# Patient Record
Sex: MALE | Race: WHITE | NOT HISPANIC OR LATINO | Employment: FULL TIME | URBAN - NONMETROPOLITAN AREA
[De-identification: names, ages, dates, MRNs, and addresses within clinical notes are randomized per-mention and may not be internally consistent; named-entity substitution may affect disease eponyms.]

---

## 2017-12-26 ENCOUNTER — OFFICE VISIT (OUTPATIENT)
Dept: PODIATRY | Facility: CLINIC | Age: 52
End: 2017-12-26

## 2017-12-26 VITALS
HEART RATE: 80 BPM | BODY MASS INDEX: 36.8 KG/M2 | DIASTOLIC BLOOD PRESSURE: 90 MMHG | HEIGHT: 69 IN | SYSTOLIC BLOOD PRESSURE: 141 MMHG | OXYGEN SATURATION: 95 % | WEIGHT: 248.46 LBS

## 2017-12-26 DIAGNOSIS — M21.542 ANKLE VARUS, ACQUIRED, LEFT: ICD-10-CM

## 2017-12-26 DIAGNOSIS — M19.079 ANKLE ARTHRITIS: Primary | ICD-10-CM

## 2017-12-26 DIAGNOSIS — M25.572 ACUTE LEFT ANKLE PAIN: ICD-10-CM

## 2017-12-26 PROCEDURE — 99203 OFFICE O/P NEW LOW 30 MIN: CPT | Performed by: PODIATRIST

## 2017-12-26 RX ORDER — MELOXICAM 15 MG/1
15 TABLET ORAL DAILY
Qty: 30 TABLET | Refills: 3 | Status: SHIPPED | OUTPATIENT
Start: 2017-12-26 | End: 2019-07-22

## 2017-12-26 RX ORDER — AMLODIPINE BESYLATE AND BENAZEPRIL HYDROCHLORIDE 5; 20 MG/1; MG/1
1 CAPSULE ORAL 2 TIMES DAILY
COMMUNITY
Start: 2017-08-09 | End: 2021-10-11 | Stop reason: ALTCHOICE

## 2018-01-08 ENCOUNTER — TELEPHONE (OUTPATIENT)
Dept: PODIATRY | Facility: CLINIC | Age: 53
End: 2018-01-08

## 2018-01-08 NOTE — TELEPHONE ENCOUNTER
I believe this is one I faxed Friday before last. I will double check tomorrow when I get back to [place] office.

## 2018-01-08 NOTE — TELEPHONE ENCOUNTER
PATIENT WIFE BETHANY CALLED ON BEHALF OF PATIENT AND HE SEEN DR SANTAMARIA 12/26/2017 AND WAS SUPPOSE TO BE REFERRED SOMEWHERE FOR AN [facility] FABRICATION.  THEY HAVE YET TO HEAR ANYTHING, NOTHING NOTED IN CHART BUT THEY CALLED [place] AND THEY HAVE NOT RECEIVED ANYTHING ON PATIENT.  IF YOU COULD PLEASE MAKE REFERRAL OR CALL AND LET PATIENT KNOW WHAT IS BEING DONE.

## 2019-07-22 ENCOUNTER — OFFICE VISIT (OUTPATIENT)
Dept: SLEEP MEDICINE | Facility: HOSPITAL | Age: 54
End: 2019-07-22

## 2019-07-22 VITALS
HEART RATE: 67 BPM | WEIGHT: 257.6 LBS | HEIGHT: 69 IN | BODY MASS INDEX: 38.16 KG/M2 | SYSTOLIC BLOOD PRESSURE: 143 MMHG | DIASTOLIC BLOOD PRESSURE: 94 MMHG | OXYGEN SATURATION: 98 %

## 2019-07-22 DIAGNOSIS — G47.33 OBSTRUCTIVE SLEEP APNEA, ADULT: Primary | ICD-10-CM

## 2019-07-22 PROCEDURE — 99203 OFFICE O/P NEW LOW 30 MIN: CPT | Performed by: INTERNAL MEDICINE

## 2019-07-22 RX ORDER — CELECOXIB 200 MG/1
200 CAPSULE ORAL DAILY
COMMUNITY

## 2019-07-22 RX ORDER — METOPROLOL SUCCINATE 25 MG/1
25 TABLET, EXTENDED RELEASE ORAL DAILY
COMMUNITY
Start: 2018-12-03 | End: 2019-12-04

## 2019-07-22 NOTE — PROGRESS NOTES
Answers for HPI/ROS submitted by the patient on 7/21/2019   Hypertension  Chronicity: recurrent  Onset: more than 1 year ago  Progression since onset: waxing and waning  Condition status: controlled  anxiety: No  blurred vision: No  chest pain: No  headaches: No  malaise/fatigue: No  neck pain: No  orthopnea: No  palpitations: No  peripheral edema: No  PND: No  shortness of breath: No  sweats: No  Agents associated with hypertension: no associated agents  CAD risks: obesity  Compliance problems: diet    New Patient Sleep Medicine Consultation    Encounter Date: 7/22/2019         Patient's PCP: System, Provider Not In  Referring provider: No ref. provider found  Reason for consultation chief complaint: snoring, excessive daytime sleepiness and unrefreshing sleep    Gerardo Cornelius is a 54 y.o. male who admits to snoring, unrestful sleep, High blod pressure, excessive daytime sleepiness, sleeping less than 6 hours per night, Disturbed or restless sleep, Up to the bathroom at night, night sweats and restless legs at night.   He denies cataplexy, sleep paralysis, or hypnagogic hallucinations. His bedtime is ~ 2000. He  falls asleep after 2 minutes, and is up 2-3 times per night. He wakes up ~ 0300. He endorses 6 hours of sleep. He drinks 1 cups of coffee, 0 teas, and 1 sodas per day. He drinks 0 alcoholic beverages per week. He is not a current smoker. He does not take sedatives or hypnotics. He has some sleepiness with driving. He naps when unstimulated.     Guy - 15    Past Medical History:   Diagnosis Date   • Hypertension      Social History     Socioeconomic History   • Marital status: Unknown     Spouse name: Not on file   • Number of children: Not on file   • Years of education: Not on file   • Highest education level: Not on file   Tobacco Use   • Smoking status: Former Smoker   • Smokeless tobacco: Current User     Types: Chew   Substance and Sexual Activity   • Alcohol use: No   • Drug use: No  "    Family History   Problem Relation Age of Onset   • Diabetes Mother    • Thyroid disease Mother    • Hypertension Father    • No Known Problems Sister    • No Known Problems Maternal Grandmother    • No Known Problems Maternal Grandfather    • No Known Problems Paternal Grandmother    • No Known Problems Paternal Grandfather    • No Known Problems Sister    • No Known Problems Sister    • No Known Problems Son    • No Known Problems Daughter    , 2 kids  0 brothers and 3 sisters  Other family history + for: thyroid, diabetes, HTN  Smoking history: smoked 0.5 ppds from age 17 until 36  FH of sleep disorders: friend    Review of Systems: + scleral injection  Constitutional: negative  Eyes: negative  Ears, nose, mouth, throat, and face: negative  Respiratory: negative  Cardiovascular: negative  Gastrointestinal: negative  Genitourinary:negative  Integument/breast: negative  Hematologic/lymphatic: negative  Musculoskeletal:negative  Neurological: negative  Behavioral/Psych: negative  Endocrine: negative  Allergic/Immunologic: negative Patient advised to discuss any positive ROS with PCP.      Vitals:    07/22/19 1548   BP: 143/94   Pulse: 67   SpO2: 98%           07/22/19  1548   Weight: 117 kg (257 lb 9.6 oz)       Body mass index is 38.02 kg/m². Patient's Body mass index is 38.02 kg/m². BMI is above normal parameters. Recommendations include: referral to primary care.  Neck circumference: 18\"          General: Alert. Cooperative. Well developed. No acute distress.             Head:  Normocephalic. Symmetrical. Atraumatic.              Eyes: Sclera clear. No icterus. PERRLA. Normal EOM.             Ears: No deformities. Normal hearing.             Nose: No septal deviation. No drainage.          Throat: No oral lesions. No thrush. Moist mucous membranes. Trachea midline    Tongue is enlarged    Dentition is fair with some crowding       Pharynx: Posterior pharyngeal pillars are narrow    Mallampati score of " IV (only hard palate visible)    Pharynx is normal with unrermarkable tonsils   Chest Wall:  Normal shape. Symmetric expansion with respiration. No tenderness.          Lungs:  Clear to auscultation bilaterally. No wheezes. No rhonchi. No rales. Respirations regular, even and unlabored.            Heart:  Regular rhythm and normal rate. Normal S1 and S2. No murmur.     Abdomen:  Soft, non-tender and non-distended. Normal bowel sounds. No masses.  Extremities:  Moves all extremities well. No edema.           Pulses: Pulses palpable and equal bilaterally.               Skin: Dry. Intact. No bleeding. No rash.           Neuro: Moves all 4 extremities and cranial nerves grossly intact.  Psychiatric: Normal mood and affect.      Current Outpatient Medications:   •  amLODIPine-benazepril (LOTREL 5-20) 5-20 MG per capsule, Take 1 capsule by mouth 2 (Two) Times a Day., Disp: , Rfl:   •  celecoxib (CeleBREX) 200 MG capsule, Take 200 mg by mouth Daily., Disp: , Rfl:   •  metoprolol succinate XL (TOPROL XL) 25 MG 24 hr tablet, Take 25 mg by mouth Daily., Disp: , Rfl:     WBC   Date Value Ref Range Status   12/03/2018 11.7 (H) 4.0 - 11.0 10*3/uL Final     RBC   Date Value Ref Range Status   12/03/2018 5.98 (H) 4.73 - 5.49 10*6/uL Final     Hemoglobin   Date Value Ref Range Status   12/03/2018 16.8 (H) 14.4 - 16.6 g/dL Final     Hematocrit   Date Value Ref Range Status   12/03/2018 51.8 (H) 42.9 - 49.1 % Final     MCV   Date Value Ref Range Status   12/03/2018 87 85 - 95 fl Final     MCH   Date Value Ref Range Status   12/03/2018 28.1 28.0 - 32.0 pg Final     MCHC   Date Value Ref Range Status   12/03/2018 32.4 32.0 - 34.0 g/dL Final     RDW   Date Value Ref Range Status   12/03/2018 39.2 37 - 54 fl Final     MPV   Date Value Ref Range Status   12/03/2018 12 8.0 - 12.0 fl Final     Platelets   Date Value Ref Range Status   12/03/2018 317 150 - 450 10*3/uL Final     Lymphocyte Rel %   Date Value Ref Range Status   12/03/2018 24.6  5 - 55 % Final     Monocyte Rel %   Date Value Ref Range Status   12/03/2018 8.2 (H) 3 - 8 % Final     Eosinophil Rel %   Date Value Ref Range Status   12/03/2018 8.7 (H) 0 - 5 % Final     Basophil Rel %   Date Value Ref Range Status   12/03/2018 0.7 0 - 2 % Final     Immature Grans %   Date Value Ref Range Status   12/03/2018 57.6 45 - 80 % Final     nRBC   Date Value Ref Range Status   12/03/2018 0 0.0 - 0.0 % Final       ASSESSMENT:  1. Obstructive sleep apnea New, further workup planned (4)  1. Check home sleep study   2. Variable work shift  3. Conjunctivitis  1. Follow up with Dr. Field    RTC in 3 months         This document has been electronically signed by Garrett Wellington MD on July 22, 2019         CC: System, Provider Not In          No ref. provider found

## 2019-08-27 ENCOUNTER — HOSPITAL ENCOUNTER (OUTPATIENT)
Dept: SLEEP MEDICINE | Facility: HOSPITAL | Age: 54
Discharge: HOME | End: 2019-08-27
Admitting: INTERNAL MEDICINE

## 2019-08-27 DIAGNOSIS — G47.33 OBSTRUCTIVE SLEEP APNEA, ADULT: ICD-10-CM

## 2019-08-27 PROCEDURE — 95800 SLP STDY UNATTENDED: CPT | Performed by: INTERNAL MEDICINE

## 2019-08-27 PROCEDURE — 95800 SLP STDY UNATTENDED: CPT

## 2019-08-30 DIAGNOSIS — G47.33 OBSTRUCTIVE SLEEP APNEA, ADULT: Primary | ICD-10-CM

## 2019-09-27 ENCOUNTER — HOSPITAL ENCOUNTER (OUTPATIENT)
Dept: SLEEP MEDICINE | Facility: HOSPITAL | Age: 54
Discharge: HOME | End: 2019-09-27
Admitting: INTERNAL MEDICINE

## 2019-09-27 VITALS — HEIGHT: 69 IN | BODY MASS INDEX: 38.06 KG/M2 | WEIGHT: 257 LBS

## 2019-09-27 DIAGNOSIS — G47.33 OBSTRUCTIVE SLEEP APNEA, ADULT: ICD-10-CM

## 2019-09-27 PROCEDURE — 95811 POLYSOM 6/>YRS CPAP 4/> PARM: CPT

## 2019-09-27 PROCEDURE — 95811 POLYSOM 6/>YRS CPAP 4/> PARM: CPT | Performed by: INTERNAL MEDICINE

## 2019-10-10 DIAGNOSIS — G47.33 OBSTRUCTIVE SLEEP APNEA, ADULT: Primary | ICD-10-CM

## 2019-10-15 ENCOUNTER — TELEPHONE (OUTPATIENT)
Dept: SLEEP MEDICINE | Facility: HOSPITAL | Age: 54
End: 2019-10-15

## 2019-10-15 NOTE — TELEPHONE ENCOUNTER
Patient called and was given results of Sleep Test.  Understood and agreed to treatment with CPAP as ordered by the physician.  Follow up appointment was scheduled.

## 2019-12-03 ENCOUNTER — OFFICE VISIT (OUTPATIENT)
Dept: SLEEP MEDICINE | Facility: HOSPITAL | Age: 54
End: 2019-12-03

## 2019-12-03 VITALS
HEIGHT: 69 IN | SYSTOLIC BLOOD PRESSURE: 141 MMHG | OXYGEN SATURATION: 98 % | HEART RATE: 68 BPM | WEIGHT: 263 LBS | BODY MASS INDEX: 38.95 KG/M2 | DIASTOLIC BLOOD PRESSURE: 89 MMHG

## 2019-12-03 DIAGNOSIS — G47.33 OBSTRUCTIVE SLEEP APNEA, ADULT: Primary | ICD-10-CM

## 2019-12-03 PROCEDURE — 99213 OFFICE O/P EST LOW 20 MIN: CPT | Performed by: NURSE PRACTITIONER

## 2019-12-03 NOTE — PROGRESS NOTES
Sleep Clinic Follow Up    Date: 12/3/2019  Primary Care Physician: Gilmra Field MD    Last office visit: 2019 (I reviewed this note)    CC: Follow up: CANDE started on BiPAP      Interim History:  Since the last visit:    1) severe CANDE -  Gerardo Cornelius has remained compliant with BiPAP. He denies mask and machine issues, dry mouth, headaches, or pressures intolerance. He denies abnormal dreams, sleep paralysis, nasal congestion, URI sx. Since starting CPAP therapy, patient reports less daytime sleepiness, more refreshing sleep, and feeling better overall. He is no longer snoring.    Sleep Testin. HST on , AHI of 60.7, bradycardia/tachycardia, severe oxygen desaturations  2. CPAP titration on 2019, recommended 25/17 cm H2O   3. Currently on 25/17 cm H2O    PAP Data:    Time frame: 10/25/2019-2019   Compliance 100 %  Average use on days used: 8 hrs 8 min  Percent of days with usage greater than or equal to 4 hours: 100%  PAP range : 25/17 cm H2O  Average 90% pressure: 24/18.5 cmH2O  Leak: 8 minutes  Average AHI 7.3 events/hr  Mask type: Full face mask  [facility]ME[facility]    Bed time: 2000  Sleep latency: 2 minutes  Number of times awakens during the night: 0  Wake time: 0300  Estimated total sleep time at night: 6-7 hours  Caffeine intake: 1 cups of coffee, 0 cups of tea, and 1 sodas per day  Alcohol intake: 0 drinks per week  Nap time: none lately   Sleepiness with Driving: none       2) Patient denies RLS symptoms.     PMHx, FH, SH reviewed and pertinent changes are: unchanged from last office visit on 2019      REVIEW OF SYSTEMS:   Negative for chest pain, SOA, fever, chills, cough, N/V/D, abdominal pain.    Smoking:none      Exam:  Vitals:    19 1533   BP: 141/89   Pulse: 68   SpO2: 98%           19  1533   Weight: 119 kg (263 lb)     Body mass index is 38.82 kg/m². Patient's Body mass index is 38.82 kg/m². BMI is above normal parameters. Recommendations  include: referral to primary care.      Gen:                No distress, conversant, pleasant, appears stated age, alert, oriented  Eyes:               Anicteric sclera, moist conjunctiva, no lid lag                           PERRL, EOMI   Heent:             NC/AT                          Oropharynx clear                          Normal hearing  Lungs:             Normal effort, non-labored breathing                          Clear to auscultation bilaterally          CV:                  Normal S1/S2, no murmur                          no lower extremity edema  ABD:               Soft, rounded, non-distended                          Normal bowel sounds                    Psych:             Appropriate affect  Neuro:             CN 2-12 appear intact    Past Medical History:   Diagnosis Date   • Hypertension        Current Outpatient Medications:   •  amLODIPine-benazepril (LOTREL 5-20) 5-20 MG per capsule, Take 1 capsule by mouth 2 (Two) Times a Day., Disp: , Rfl:   •  celecoxib (CeleBREX) 200 MG capsule, Take 200 mg by mouth Daily., Disp: , Rfl:   •  metoprolol succinate XL (TOPROL XL) 25 MG 24 hr tablet, Take 25 mg by mouth Daily., Disp: , Rfl:       Assessment and Plan:    1. Obstructive sleep apnea stable chronic illness and Established, stable (1)  1. Compliant with PAP therapy  2. Continue PAP as prescribed  3. Script for PAP supplies  4. Recommend chin strap  5. Return to clinic in 1 year with compliance report unless change in symptoms in interim period          10 of 20 minutes spent face-to-face counseling patient extensively regarding:   PAP therapy, PAP compliance and PAP maintenance      RTC in 12 months. Patient agrees to return sooner if changes in symptoms.        This document has been electronically signed by CARLY Rodriguez on December 3, 2019 3:39 PM          CC: Gilmar Field MD          No ref. provider found

## 2020-12-15 ENCOUNTER — OFFICE VISIT (OUTPATIENT)
Dept: SLEEP MEDICINE | Facility: HOSPITAL | Age: 55
End: 2020-12-15

## 2020-12-15 VITALS
DIASTOLIC BLOOD PRESSURE: 83 MMHG | HEART RATE: 72 BPM | BODY MASS INDEX: 39.25 KG/M2 | SYSTOLIC BLOOD PRESSURE: 153 MMHG | WEIGHT: 265 LBS | OXYGEN SATURATION: 98 % | HEIGHT: 69 IN

## 2020-12-15 DIAGNOSIS — G47.33 OBSTRUCTIVE SLEEP APNEA, ADULT: Primary | ICD-10-CM

## 2020-12-15 PROCEDURE — 99213 OFFICE O/P EST LOW 20 MIN: CPT | Performed by: NURSE PRACTITIONER

## 2020-12-15 RX ORDER — METOPROLOL SUCCINATE 25 MG/1
25 TABLET, EXTENDED RELEASE ORAL DAILY
COMMUNITY
Start: 2020-06-15 | End: 2021-06-16

## 2020-12-15 NOTE — PROGRESS NOTES
Sleep Clinic Follow Up    Date: 12/15/2020  Primary Care Provider: Gilmar Field MD    Last office visit: 2019 (I reviewed this note)    CC: Follow up: CANDE on BiPAP      Interim History:  Since the last visit:    1) severe CANDE -  Gerardo Cornelius has remained compliant with BiPAP. He denies mask and machine issues, dry mouth, headaches, or pressures intolerance. He denies abnormal dreams, sleep paralysis, nasal congestion, URI sx.    Sleep Testin. HST on 2019, AHI of 60.7, bradycardia, severe O2 desaturations   2. CPAP titration on 2019, recommended 25/17 cm H2O   3. Currently on 25/17 cm H2O, pressure support 5-8    PAP Data:    Time frame: 12/15/2019-2020   Compliance: 99.7 %  Average use on days used: 8 hrs 6 min  Percent of days with usage greater than or equal to 4 hours: 99.7%  PAP range: 25/17 cm H2O  Average 90% pressure: 23.4/17.5 cmH2O  Leak: 43 minutes  Average AHI: 4.0 events/hr  Mask type: Full face mask  DME: [facility]    Bed time: 2000  Sleep latency: 2 minutes  Number of times awakens during the night: 0  Wake time: 3716-2640  Estimated total sleep time at night: 8 hours  Caffeine intake: 1 cups of decaf coffee, 0 cups of tea, and 1-2 caffeine free sodas per day  Alcohol intake: 0 drinks per week  Nap time: none   Sleepiness with Driving: none     [place] - 0    2) Patient denies RLS symptoms.    PMHx, FH, SH reviewed and pertinent changes are: Reportedly unchanged from last office visit with us.      REVIEW OF SYSTEMS:   Negative for chest pain, SOA, fever, chills, cough, N/V/D, abdominal pain.    Smoking:none      Exam:  Vitals:    12/15/20 1359   BP: 153/83   Pulse: 72   SpO2: 98%           12/15/20  1359   Weight: 120 kg (265 lb)     Body mass index is 39.12 kg/m². Patient's Body mass index is 39.12 kg/m². BMI is above normal parameters. Recommendations include: referral to primary care.      Gen:                No distress, conversant, pleasant, appears  stated age, alert, oriented  Eyes:               Anicteric sclera, moist conjunctiva, no lid lag                           PERRL, EOMI   Heent:             NC/AT                          Oropharynx clear                          Normal hearing  Lungs:             Normal effort, non-labored breathing                          Clear to auscultation bilaterally          CV:                  Normal S1/S2, no murmur                          No lower extremity edema  ABD:               Soft, rounded, non-distended                          Normal bowel sounds                    Psych:             Appropriate affect  Neuro:             CN 2-12 appear intact    Past Medical History:   Diagnosis Date   • Hypertension        Current Outpatient Medications:   •  metoprolol succinate XL (TOPROL-XL) 25 MG 24 hr tablet, Take 25 mg by mouth Daily., Disp: , Rfl:   •  amLODIPine-benazepril (LOTREL 5-20) 5-20 MG per capsule, Take 1 capsule by mouth 2 (Two) Times a Day., Disp: , Rfl:   •  celecoxib (CeleBREX) 200 MG capsule, Take 200 mg by mouth Daily., Disp: , Rfl:       Assessment and Plan:    1. Obstructive sleep apnea - Established, stable (1)  1. Compliant with PAP therapy  2. Continue PAP as prescribed  3. Script for PAP supplies  4. Return to clinic in 1 year with compliance report unless change in symptoms in interim period        8 of 15 minutes spent face-to-face counseling patient extensively regarding:   PAP therapy, PAP compliance and PAP maintenance      RTC in 12 months. Patient agrees to return sooner if changes in symptoms.        This document has been electronically signed by CARLY Rodriguez on December 15, 2020 14:08 CST          CC: Gilmar Field MD          No ref. provider found

## 2021-01-16 PROCEDURE — 87635 SARS-COV-2 COVID-19 AMP PRB: CPT | Performed by: NURSE PRACTITIONER

## 2021-07-07 ENCOUNTER — TELEPHONE (OUTPATIENT)
Dept: PODIATRY | Facility: CLINIC | Age: 56
End: 2021-07-07

## 2021-07-07 NOTE — TELEPHONE ENCOUNTER
Message was sent to our office via [employer], this is a CPAP machine and we are podiatry so we do not handle this.  Will you please assist patient

## 2021-07-07 NOTE — TELEPHONE ENCOUNTER
----- Message from Gerardo Cornelius sent at 7/6/2021  5:50 PM CDT -----  Regarding: Non-Urgent Medical Question  Contact: [phone]  I use a [employer] and have been made aware of the recall on these devices.  What do I need to do?

## 2021-10-08 NOTE — PROGRESS NOTES
Gerardo Cornelius is a 56 y.o. male who presents to the office for a DOT Exam. 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

## 2021-10-11 ENCOUNTER — CLINICAL SUPPORT (OUTPATIENT)
Dept: FAMILY MEDICINE CLINIC | Facility: CLINIC | Age: 56
End: 2021-10-11

## 2021-10-11 VITALS
SYSTOLIC BLOOD PRESSURE: 132 MMHG | DIASTOLIC BLOOD PRESSURE: 82 MMHG | HEIGHT: 69 IN | HEART RATE: 60 BPM | BODY MASS INDEX: 38.36 KG/M2 | OXYGEN SATURATION: 97 % | WEIGHT: 259 LBS | TEMPERATURE: 97 F

## 2021-10-11 DIAGNOSIS — Z02.89 ENCOUNTER FOR EXAMINATION REQUIRED BY DEPARTMENT OF TRANSPORTATION (DOT): Primary | ICD-10-CM

## 2021-10-11 PROCEDURE — DOTPHY: Performed by: INTERNAL MEDICINE

## 2021-10-11 RX ORDER — METOPROLOL SUCCINATE 25 MG/1
TABLET, EXTENDED RELEASE ORAL
COMMUNITY
Start: 2021-09-13

## 2021-10-11 RX ORDER — LISINOPRIL 30 MG/1
TABLET ORAL
COMMUNITY
Start: 2021-09-13

## 2021-10-11 RX ORDER — AMLODIPINE BESYLATE 10 MG/1
TABLET ORAL
COMMUNITY
Start: 2021-09-13

## 2021-12-08 ENCOUNTER — TELEPHONE (OUTPATIENT)
Dept: SLEEP MEDICINE | Facility: HOSPITAL | Age: 56
End: 2021-12-08

## 2021-12-17 ENCOUNTER — OFFICE VISIT (OUTPATIENT)
Dept: SLEEP MEDICINE | Facility: HOSPITAL | Age: 56
End: 2021-12-17

## 2021-12-17 VITALS
SYSTOLIC BLOOD PRESSURE: 156 MMHG | HEART RATE: 77 BPM | OXYGEN SATURATION: 96 % | DIASTOLIC BLOOD PRESSURE: 86 MMHG | BODY MASS INDEX: 38.95 KG/M2 | HEIGHT: 69 IN | WEIGHT: 263 LBS

## 2021-12-17 DIAGNOSIS — G47.33 OBSTRUCTIVE SLEEP APNEA, ADULT: Primary | ICD-10-CM

## 2021-12-17 DIAGNOSIS — E66.01 MORBID OBESITY (HCC): ICD-10-CM

## 2021-12-17 PROCEDURE — 99212 OFFICE O/P EST SF 10 MIN: CPT | Performed by: NURSE PRACTITIONER

## 2021-12-17 NOTE — PROGRESS NOTES
Sleep Clinic Follow Up    Date: 2021  Primary Care Provider: Gilmar Field MD    Last office visit: 12/15/2020 (I reviewed this note)    CC: Follow up: CANDE on BiPAP      Interim History:  Since the last visit:    1) severe CANDE -  Gerardo Cornelius has remained compliant with BiPAP. He denies mask and machine issues, dry mouth, headaches, or pressures intolerance. He denies abnormal dreams, sleep paralysis, nasal congestion, URI sx. Patient has received his replacement BiPAP machine.    2) Patient denies RLS symptoms.     Sleep Testin. HST on 2019, AHI of 60.7, hypoxia   2. PAP titration on 2019, recommended 25/17 cm H2O   3. Currently on autoBiPAP max IPAP 25, min EPAP 17 cm H2O, PS 5-8    PAP Data:    Time frame: 2021-2021   Compliance: 97.6%  Average use on days used: 8 hrs 7 min  Percent of days with usage greater than or equal to 4 hours: 95.2%  PAP range: autoBiPAP max IPAP 25, min EPAP 17 cm H2O, PS 5-8  Average 90% pressure: 23.6/18 cmH2O  Leak: 44 minutes  Average AHI: 4.8 events/hr  Mask type: Full face mask  DME: [facility]    Bed time: 2000  Sleep latency: 1-5 minutes  Number of times awakens during the night: 0  Wake time: 7351-8634  Estimated total sleep time at night: 8 hours  Caffeine intake: 1 cups of coffee, 0 cups of tea, and 1-2 caffeine free sodas per day  Alcohol intake: 0 drinks per week  Nap time: none   Sleepiness with Driving: none     [place] - 0    PMHx, FH, SH reviewed and pertinent changes are: Reportedly unchanged from last office visit with us.      REVIEW OF SYSTEMS:   Negative for chest pain, SOA, fever, chills, cough, N/V/D, abdominal pain.    Smoking:none    Gerardo Cornelius  reports that he has quit smoking. His smoking use included cigarettes. He has a 7.50 pack-year smoking history. His smokeless tobacco use includes chew.      Exam:  Vitals:    21 1415   BP: 156/86   Pulse: 77   SpO2: 96%           21  1415   Weight: 119 kg  (263 lb)     Body mass index is 38.82 kg/m². Patient's Body mass index is 38.82 kg/m². indicating that he is morbidly obese (BMI > 40 or > 35 with obesity - related health condition). Obesity-related health conditions include the following: obstructive sleep apnea and hypertension. Obesity is unchanged. BMI is is above average; BMI management plan is completed. I recommend portion control and increasing exercise.      Gen:                No distress, conversant, pleasant, appears stated age, alert, oriented  Eyes:               Anicteric sclera, moist conjunctiva, no lid lag                           PERRL, EOMI   Heent:             NC/AT                          Oropharynx clear                          Normal hearing  Lungs:             Normal effort, non-labored breathing       CV:                  Normal S1/S2, no murmur                          No lower extremity edema  ABD:               Soft, rounded, non-distended                    Psych:             Appropriate affect  Neuro:             CN 2-12 appear intact    Past Medical History:   Diagnosis Date   • Hypertension        Current Outpatient Medications:   •  amLODIPine (NORVASC) 10 MG tablet, , Disp: , Rfl:   •  celecoxib (CeleBREX) 200 MG capsule, Take 200 mg by mouth Daily., Disp: , Rfl:   •  lisinopril (PRINIVIL,ZESTRIL) 30 MG tablet, , Disp: , Rfl:   •  metoprolol succinate XL (TOPROL-XL) 25 MG 24 hr tablet, , Disp: , Rfl:     WBC   Date Value Ref Range Status   12/03/2018 11.7 (H) 4.0 - 11.0 10*3/uL Final     RBC   Date Value Ref Range Status   12/03/2018 5.98 (H) 4.73 - 5.49 10*6/uL Final     Hemoglobin   Date Value Ref Range Status   12/03/2018 16.8 (H) 14.4 - 16.6 g/dL Final     Hematocrit   Date Value Ref Range Status   12/03/2018 51.8 (H) 42.9 - 49.1 % Final     MCV   Date Value Ref Range Status   12/03/2018 87 85 - 95 fl Final     MCH   Date Value Ref Range Status   12/03/2018 28.1 28.0 - 32.0 pg Final     MCHC   Date Value Ref Range Status    12/03/2018 32.4 32.0 - 34.0 g/dL Final     RDW   Date Value Ref Range Status   12/03/2018 39.2 37 - 54 fl Final     MPV   Date Value Ref Range Status   12/03/2018 12 8.0 - 12.0 fl Final     Platelets   Date Value Ref Range Status   12/03/2018 317 150 - 450 10*3/uL Final     Lymphocyte Rel %   Date Value Ref Range Status   12/03/2018 24.6 5 - 55 % Final     Monocyte Rel %   Date Value Ref Range Status   12/03/2018 8.2 (H) 3 - 8 % Final     Eosinophil Rel %   Date Value Ref Range Status   12/03/2018 8.7 (H) 0 - 5 % Final     Basophil Rel %   Date Value Ref Range Status   12/03/2018 0.7 0 - 2 % Final     Immature Grans %   Date Value Ref Range Status   12/03/2018 57.6 45 - 80 % Final     nRBC   Date Value Ref Range Status   12/03/2018 0 0.0 - 0.0 % Final       Lab Results   Component Value Date    BUN 15 10/26/2021    CREATININE 1.1 10/26/2021    EGFRIFAFRI 84 10/26/2021    K 5.5 (H) 10/26/2021    CO2 31 10/26/2021    CALCIUM 10.2 10/26/2021    ALBUMIN 4.4 10/26/2021    AST 14 10/26/2021    ALT 19 10/26/2021       Assessment and Plan:    1. Obstructive sleep apnea - Established, stable (1)  1. Compliant with PAP therapy  2. Continue PAP as prescribed  3. Script for PAP supplies  4. Return to clinic in 1 year with compliance report unless change in symptoms in interim period  2. Morbid obesity - BMI 38.8 - stable chronic illness      I spent 15 minutes caring for Gerardo on this date of service. This time includes time spent by me in the following activities: preparing for the visit, reviewing tests, obtaining and/or reviewing a separately obtained history, performing a medically appropriate examination and/or evaluation , counseling and educating the patient/family/caregiver and documenting information in the medical record; discussing PAP therapy, PAP compliance and PAP maintenance    RTC in 12 months. Patient agrees to return sooner if changes in symptoms.        This document has been electronically signed by  Anne Wilson, APRN on December 17, 2021 14:21 CST          CC: Gilmar Field MD          No ref. provider found

## 2022-09-21 ENCOUNTER — CLINICAL SUPPORT (OUTPATIENT)
Dept: FAMILY MEDICINE CLINIC | Facility: CLINIC | Age: 57
End: 2022-09-21

## 2022-09-21 VITALS
HEIGHT: 69 IN | SYSTOLIC BLOOD PRESSURE: 132 MMHG | HEART RATE: 67 BPM | WEIGHT: 255 LBS | BODY MASS INDEX: 37.77 KG/M2 | OXYGEN SATURATION: 99 % | DIASTOLIC BLOOD PRESSURE: 74 MMHG

## 2022-09-21 DIAGNOSIS — Z02.89 ENCOUNTER FOR EXAMINATION REQUIRED BY DEPARTMENT OF TRANSPORTATION (DOT): Primary | ICD-10-CM

## 2022-09-21 PROCEDURE — DOTPHY: Performed by: NURSE PRACTITIONER

## 2022-09-21 NOTE — PROGRESS NOTES
Gerardo Cornelius is a 57 y.o. male who presents to the office for a DOT Exam. 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

## 2022-12-19 ENCOUNTER — OFFICE VISIT (OUTPATIENT)
Dept: SLEEP MEDICINE | Facility: HOSPITAL | Age: 57
End: 2022-12-19

## 2022-12-19 VITALS
OXYGEN SATURATION: 96 % | WEIGHT: 265 LBS | HEART RATE: 80 BPM | HEIGHT: 69 IN | BODY MASS INDEX: 39.25 KG/M2 | SYSTOLIC BLOOD PRESSURE: 164 MMHG | DIASTOLIC BLOOD PRESSURE: 92 MMHG

## 2022-12-19 DIAGNOSIS — G47.33 OBSTRUCTIVE SLEEP APNEA: Primary | ICD-10-CM

## 2022-12-19 DIAGNOSIS — E66.01 MORBID OBESITY: ICD-10-CM

## 2022-12-19 PROCEDURE — 99213 OFFICE O/P EST LOW 20 MIN: CPT | Performed by: NURSE PRACTITIONER

## 2022-12-19 NOTE — PROGRESS NOTES
Sleep Clinic Follow Up    Date: 2022  Primary Care Provider: Gilmar Field MD    Last office visit: 2021 (I reviewed this note)    CC: Follow up: CANDE on BiPAP      Interim History:  Since the last visit:    1) severe CANDE -  Gerardo Cornelius has remained compliant with BiPAP. He denies mask and machine issues, dry mouth, headaches, or pressures intolerance. He denies abnormal dreams, sleep paralysis, nasal congestion, URI sx.    2) Patient denies RLS symptoms.     Sleep Testin. HST on 2019, AHI of 60.7, hypoxia   2. PAP titration on 2019, recommended 25/17 cm H2O   3. Currently on autoBiPAP max IPAP 25, min EPAP 17, PS 5-8 cm H2O    PAP Data:    Time frame: 2021-2022   Compliance: 99.4%  Average use on days used: 8 hrs 7 min  Percent of days with usage greater than or equal to 4 hours: 99.4%  PAP range: max IPAP 25, min EPAP 17, PS 5-8 cm H2O  Average 90% pressure: 23.4/17 cmH2O  Leak: 51 minutes  Average AHI: 4.0 events/hr  Mask type: Full face mask  DME: [facility]  Machine type: [employer] Dream[employer]    Bed time: 2000  Sleep latency: 1-5 minutes  Number of times awakens during the night: 0  Wake time: 8154-5525  Estimated total sleep time at night: 8 hours  Caffeine intake: 1 cups of decaf coffee, 1-2 cups of decaf tea, and 1-2 decaf sodas per day  Alcohol intake: 0 drinks per week  Nap time: rare   Sleepiness with Driving: none     [place] - 6  [place] Sleepiness Scale  Sitting and reading: Slight chance of dozing  Watching TV: Slight chance of dozing  Sitting, inactive in a public place (e.g. a theatre or a meeting): Slight chance of dozing  As a passenger in a car for an hour without a break: Slight chance of dozing  Lying down to rest in the afternoon when circumstances permit: Moderate chance of dozing  Sitting and talking to someone: Would never doze  Sitting quietly after a lunch without alcohol: Would never doze  In a car, while stopped for a few  minutes in traffic: Would never doze  Total score: 6    PMHx, FH, SH reviewed and pertinent changes are: Reportedly unchanged from last office visit with us.      Review of Systems:   Negative for chest pain, SOA, fever, chills, cough, N/V/D, abdominal pain.    Smoking:none    Gerardo Cornelius  reports that he has quit smoking. His smoking use included cigarettes. He has a 7.50 pack-year smoking history. His smokeless tobacco use includes chew.      Physical Exam:  Vitals:    12/19/22 1428   BP: 164/92   Pulse: 80   SpO2: 96%           12/19/22  1428   Weight: 120 kg (265 lb)     Body mass index is 39.12 kg/m². Class 2 Severe Obesity (BMI >=35 and <=39.9). Obesity-related health conditions include the following: obstructive sleep apnea and hypertension. Obesity is unchanged. BMI is is above average; BMI management plan is completed. I recommend portion control and increasing exercise.    Gen:                No distress, conversant, pleasant, appears stated age, alert, oriented  Eyes:               Anicteric sclera, moist conjunctiva, no lid lag                           PERRL, EOMI   Heent:             NC/AT                          Oropharynx clear                          Normal hearing  Lungs:             Normal effort, non-labored breathing      CV:                  Normal S1/S2                          No lower extremity edema  ABD:               Soft, rounded, non-distended                 Psych:             Appropriate affect  Neuro:             CN 2-12 appear intact    Past Medical History:   Diagnosis Date   • Hypertension        Current Outpatient Medications:   •  amLODIPine (NORVASC) 10 MG tablet, , Disp: , Rfl:   •  celecoxib (CeleBREX) 200 MG capsule, Take 200 mg by mouth Daily., Disp: , Rfl:   •  lisinopril (PRINIVIL,ZESTRIL) 30 MG tablet, , Disp: , Rfl:   •  metoprolol succinate XL (TOPROL-XL) 25 MG 24 hr tablet, , Disp: , Rfl:     WBC   Date Value Ref Range Status   12/03/2018 11.7 (H) 4.0 - 11.0  10*3/uL Final     RBC   Date Value Ref Range Status   12/03/2018 5.98 (H) 4.73 - 5.49 10*6/uL Final     Hemoglobin   Date Value Ref Range Status   12/03/2018 16.8 (H) 14.4 - 16.6 g/dL Final     Hematocrit   Date Value Ref Range Status   12/03/2018 51.8 (H) 42.9 - 49.1 % Final     MCV   Date Value Ref Range Status   12/03/2018 87 85 - 95 fl Final     MCH   Date Value Ref Range Status   12/03/2018 28.1 28.0 - 32.0 pg Final     MCHC   Date Value Ref Range Status   12/03/2018 32.4 32.0 - 34.0 g/dL Final     RDW   Date Value Ref Range Status   12/03/2018 39.2 37 - 54 fl Final     MPV   Date Value Ref Range Status   12/03/2018 12 8.0 - 12.0 fl Final     Platelets   Date Value Ref Range Status   12/03/2018 317 150 - 450 10*3/uL Final     Lymphocyte Rel %   Date Value Ref Range Status   12/03/2018 24.6 5 - 55 % Final     Monocyte Rel %   Date Value Ref Range Status   12/03/2018 8.2 (H) 3 - 8 % Final     Eosinophil Rel %   Date Value Ref Range Status   12/03/2018 8.7 (H) 0 - 5 % Final     Basophil Rel %   Date Value Ref Range Status   12/03/2018 0.7 0 - 2 % Final     Immature Grans %   Date Value Ref Range Status   12/03/2018 57.6 45 - 80 % Final     nRBC   Date Value Ref Range Status   12/03/2018 0 0.0 - 0.0 % Final       Lab Results   Component Value Date    BUN 17 09/12/2022    CREATININE 1.3 09/12/2022    EGFRIFAFRI 84 10/26/2021    K 4.5 09/12/2022    CO2 26 09/12/2022    CALCIUM 9.0 09/12/2022    ALBUMIN 3.8 09/12/2022    AST 15 09/12/2022    ALT 20 09/12/2022       Assessment and Plan:    1. Obstructive sleep apnea - Established, stable (1)  1. Compliant with PAP therapy  2. Continue PAP as prescribed  3. Script for PAP supplies  4. Pertinent labs were reviewed as listed above  5. Return to clinic in 1 year with compliance report unless change in symptoms in interim period  2. Morbid obesity - BMI 39.1 - stable chronic illness      I spent 15 minutes caring for Gerardo on this date of service. This time includes time  spent by me in the following activities: preparing for the visit, reviewing tests, obtaining and/or reviewing a separately obtained history, performing a medically appropriate examination and/or evaluation , counseling and educating the patient/family/caregiver, documenting information in the medical record and care coordination; discussing PAP therapy, PAP compliance and PAP maintenance    RTC in 12 months. Patient agrees to return sooner if changes in symptoms.        This document has been electronically signed by CARLY Farah on December 19, 2022 14:36 CST          CC: Gilmar Field MD          No ref. provider found

## 2023-06-15 ENCOUNTER — PREP FOR SURGERY (OUTPATIENT)
Dept: OTHER | Facility: HOSPITAL | Age: 58
End: 2023-06-15
Payer: COMMERCIAL

## 2023-06-15 DIAGNOSIS — R19.5 POSITIVE COLORECTAL CANCER SCREENING USING COLOGUARD TEST: ICD-10-CM

## 2023-06-15 DIAGNOSIS — Z12.11 ENCOUNTER FOR SCREENING FOR MALIGNANT NEOPLASM OF COLON: Primary | ICD-10-CM

## 2023-06-15 RX ORDER — DEXTROSE AND SODIUM CHLORIDE 5; .45 G/100ML; G/100ML
30 INJECTION, SOLUTION INTRAVENOUS CONTINUOUS PRN
OUTPATIENT
Start: 2023-06-15

## 2023-06-15 RX ORDER — SODIUM CHLORIDE 9 MG/ML
40 INJECTION, SOLUTION INTRAVENOUS AS NEEDED
OUTPATIENT
Start: 2023-06-15

## 2023-06-19 PROBLEM — R19.5 POSITIVE COLORECTAL CANCER SCREENING USING COLOGUARD TEST: Status: ACTIVE | Noted: 2023-06-19
